# Patient Record
Sex: FEMALE | URBAN - METROPOLITAN AREA
[De-identification: names, ages, dates, MRNs, and addresses within clinical notes are randomized per-mention and may not be internally consistent; named-entity substitution may affect disease eponyms.]

---

## 2019-02-04 ENCOUNTER — OFFICE VISIT (OUTPATIENT)
Dept: URBAN - METROPOLITAN AREA CLINIC 18 | Facility: CLINIC | Age: 84
End: 2019-02-04
Payer: MEDICARE

## 2019-02-04 DIAGNOSIS — H10.45 OTHER CHRONIC ALLERGIC CONJUNCTIVITIS: Primary | ICD-10-CM

## 2019-02-04 DIAGNOSIS — H01.009 BLEPHARITIS OF EYELID: ICD-10-CM

## 2019-02-04 DIAGNOSIS — H25.13 AGE-RELATED NUCLEAR CATARACT, BILATERAL: ICD-10-CM

## 2019-02-04 DIAGNOSIS — H04.123 DRY EYE SYNDROME OF BILATERAL LACRIMAL GLANDS: ICD-10-CM

## 2019-02-04 PROCEDURE — 99204 OFFICE O/P NEW MOD 45 MIN: CPT | Performed by: OPTOMETRIST

## 2019-02-04 RX ORDER — PREDNISOLONE/GATIFLOX/NEPAFEN 1-0.5-0.1%
1 % SUSPENSION, DROPS(FINAL DOSAGE FORM)(ML) OPHTHALMIC (EYE)
Qty: 1 | Refills: 0 | Status: ACTIVE
Start: 2019-02-04

## 2019-02-04 ASSESSMENT — INTRAOCULAR PRESSURE: OS: 12

## 2019-02-04 ASSESSMENT — KERATOMETRY
OD: 44.00
OS: 43.75

## 2019-02-04 NOTE — IMPRESSION/PLAN
Impression: Other chronic allergic conjunctivitis: H10.45. Plan: Discussed condition and treatment options with patient. Start Alaway BID OU x 4-6 weeks for maintenance as needed. Recommend lid scrubs.

## 2019-02-04 NOTE — IMPRESSION/PLAN
Impression: Blepharitis of eyelid: H01.009. OU. Plan: Discussed diagnosis in detail with patient. Recommend using lid scrubs (10 samples given). Will follow up in 1 week.

## 2019-02-13 ENCOUNTER — OFFICE VISIT (OUTPATIENT)
Dept: URBAN - METROPOLITAN AREA CLINIC 18 | Facility: CLINIC | Age: 84
End: 2019-02-13
Payer: MEDICARE

## 2019-02-13 PROCEDURE — 99213 OFFICE O/P EST LOW 20 MIN: CPT | Performed by: OPTOMETRIST

## 2019-02-13 ASSESSMENT — INTRAOCULAR PRESSURE
OS: 17
OD: 16

## 2019-02-13 NOTE — IMPRESSION/PLAN
Impression: Blepharitis of eyelid: H01.009. OU. Plan: Discussed diagnosis in detail with patient. Recommend using lid scrubs (10 samples given). Pt can D/C Pred ace, over the next 4-6 weeks we want to start Alaway BID OU.
